# Patient Record
Sex: FEMALE | Race: OTHER
[De-identification: names, ages, dates, MRNs, and addresses within clinical notes are randomized per-mention and may not be internally consistent; named-entity substitution may affect disease eponyms.]

---

## 2018-12-07 NOTE — EMERGENCY ROOM REPORT
History of Present Illness


General


Chief Complaint:  Pain


Source:  Patient





Present Illness


HPI


Patient states that she has had a nephrostomy tube in her left kidney for the 

past 10 years.  She has a history of UVJ obstruction.  She states that she was 

admitted to another hospital 3 days ago.  She was found to have Escherichia 

coli bacteremia.  She states that she had a PICC line in place but states she 

noted that the insertion site was red and swollen and it was bothering her and 

so she removed the PICC line in her left arm.  She states that it looked 

infected and she has had infected PICC line's previously.  She states that she 

is also had nausea and vomiting and a headache over the past day.  She was 

supposed to be getting ertapenem via her PICC line for the past 2 days but 

because she removed the PICC line she has not gotten the ertapenem.  She has no 

other complaints.


Allergies:  


Coded Allergies:  


     No Known Allergies (Unverified , 12/7/18)





Patient History


Past Medical History:  none, see triage record


Social History:  Denies: smoking, alcohol use, drug use


Last Menstrual Period:  12/04/18


Reviewed Nursing Documentation:  PMH: Agreed; PSxH: Agreed





Nursing Documentation-PMH


Hx Dialysis:  No - nephrostomy tube on left side





Review of Systems


All Other Systems:  negative except mentioned in HPI





Physical Exam





Vital Signs








  Date Time  Temp Pulse Resp B/P (MAP) Pulse Ox O2 Delivery O2 Flow Rate FiO2


 


12/7/18 16:15 97.9 95 18 130/91 99 Room Air  








Sp02 EP Interpretation:  reviewed, normal


General Appearance:  no apparent distress, alert, GCS 15, non-toxic


Head:  normocephalic, atraumatic


Eyes:  bilateral eye normal inspection, bilateral eye PERRL


ENT:  hearing grossly normal, normal pharynx, no angioedema, normal voice


Neck:  full range of motion, supple/symm/no masses


Respiratory:  chest non-tender, lungs clear, normal breath sounds, no 

respiratory distress, no retraction, no accessory muscle use, speaking full 

sentences


Cardiovascular #1:  regular rate, rhythm, no edema


Gastrointestinal:  normal bowel sounds, non tender, soft, non-distended, no 

guarding, no rebound


Rectal:  deferred


Genitourinary:  other - Nephrostomy tube in place draining clear urine.


Musculoskeletal:  back normal, gait/station normal, normal range of motion, non-

tender


Neurologic:  alert, oriented x3, responsive, motor strength/tone normal, 

sensory intact, speech normal


Psychiatric:  judgement/insight normal, memory normal, mood/affect normal, no 

suicidal/homicidal ideation


Skin:  normal color, no rash, warm/dry, well hydrated





Medical Decision Making


Diagnostic Impression:  


 Primary Impression:  


 Flank pain


 Additional Impressions:  


 Nausea


 Diarrhea


ER Course


This patient removed her own PICC line.  She states that she has positive blood 

cultures for another facility.  The patient's evaluation here in the emergency 

Department is benign.  Laboratory workup to include a CBC, CMP and urinalysis 

are unremarkable.  The patient is afebrile well-appearing and nontoxic.  The 

patient was given the ertapenem dose for today.  I offered the patient 

admission to the hospital so that she can get PICC line placement tomorrow.  

However, she declined stating that she will go to her usual hospital and obtain 

a PICC line tomorrow morning.  She states she feels well and does not want to 

be admitted to the hospital at this time.  Given her benign workup and nontoxic 

evaluation I felt that this was appropriate.  I suspect the patient's nausea 

and diarrhea is secondary to medication side effects.  The patient is given 

close return precautions and follow-up instructions.





Please note that this Emergency Department Report was dictated using [employer] technology software, occasionally this can lead to 

erroneous entry secondary to interpretation by the dictation equipment.





Laboratory Tests








Test


  12/7/18


16:35 12/7/18


16:50 12/7/18


19:08


 


White Blood Count


  6.4 K/UL


(4.8-10.8) 


  


 


 


Red Blood Count


  4.26 M/UL


(4.20-5.40) 


  


 


 


Hemoglobin


  10.5 G/DL


(12.0-16.0)  L 


  


 


 


Hematocrit


  32.6 %


(37.0-47.0)  L 


  


 


 


Mean Corpuscular Volume


  76 FL (80-99)


L 


  


 


 


Mean Corpuscular Hemoglobin


  24.6 PG


(27.0-31.0)  L 


  


 


 


Mean Corpuscular Hemoglobin


Concent 32.1 G/DL


(32.0-36.0) 


  


 


 


Red Cell Distribution Width


  13.6 %


(11.6-14.8) 


  


 


 


Platelet Count


  430 K/UL


(150-450) 


  


 


 


Mean Platelet Volume


  5.3 FL


(6.5-10.1)  L 


  


 


 


Neutrophils (%) (Auto)


  59.7 %


(45.0-75.0) 


  


 


 


Lymphocytes (%) (Auto)


  26.8 %


(20.0-45.0) 


  


 


 


Monocytes (%) (Auto)


  8.3 %


(1.0-10.0) 


  


 


 


Eosinophils (%) (Auto)


  3.9 %


(0.0-3.0)  H 


  


 


 


Basophils (%) (Auto)


  1.3 %


(0.0-2.0) 


  


 


 


Sodium Level


  137 MMOL/L


(136-145) 


  


 


 


Potassium Level


  3.6 MMOL/L


(3.5-5.1) 


  


 


 


Chloride Level


  102 MMOL/L


() 


  


 


 


Carbon Dioxide Level


  25 MMOL/L


(21-32) 


  


 


 


Anion Gap


  10 mmol/L


(5-15) 


  


 


 


Blood Urea Nitrogen


  13 mg/dL


(7-18) 


  


 


 


Creatinine


  0.7 MG/DL


(0.55-1.30) 


  


 


 


Estimate Glomerular


Filtration Rate > 60 mL/min


(>60) 


  


 


 


Glucose Level


  101 MG/DL


() 


  


 


 


Lactic Acid Level


  2.00 mmol/L


(0.4-2.0) 


  Pending  


 


 


Calcium Level


  8.3 MG/DL


(8.5-10.1)  L 


  


 


 


Phosphorus Level


  3.2 MG/DL


(2.5-4.9) 


  


 


 


Magnesium Level


  1.7 MG/DL


(1.8-2.4)  L 


  


 


 


Total Bilirubin


  0.2 MG/DL


(0.2-1.0) 


  


 


 


Aspartate Amino Transferase


(AST) 20 U/L (15-37)


  


  


 


 


Alanine Aminotransferase (ALT)


  17 U/L (12-78)


  


  


 


 


Alkaline Phosphatase


  79 U/L


() 


  


 


 


Total Creatine Kinase


  56 U/L


() 


  


 


 


Creatine Kinase MB


  0.5 NG/ML


(0.0-3.6) 


  


 


 


Creatine Kinase MB Relative


Index 0.8  


  


  


 


 


Troponin I


  0.000 ng/mL


(0.000-0.056) 


  


 


 


Total Protein


  8.0 G/DL


(6.4-8.2) 


  


 


 


Albumin


  3.5 G/DL


(3.4-5.0) 


  


 


 


Globulin 4.5 g/dL    


 


Albumin/Globulin Ratio


  0.8 (1.0-2.7)


L 


  


 


 


Urine Color  Pale yellow   


 


Urine Appearance  Clear   


 


Urine pH  6.5 (4.5-8.0)   


 


Urine Specific Gravity


  


  1.010


(1.005-1.035) 


 


 


Urine Protein


  


  2+ (NEGATIVE)


H 


 


 


Urine Glucose (UA)


  


  Negative


(NEGATIVE) 


 


 


Urine Ketones


  


  Negative


(NEGATIVE) 


 


 


Urine Blood


  


  3+ (NEGATIVE)


H 


 


 


Urine Nitrite


  


  Negative


(NEGATIVE) 


 


 


Urine Bilirubin


  


  Negative


(NEGATIVE) 


 


 


Urine Urobilinogen


  


  Normal MG/DL


(0.0-1.0) 


 


 


Urine Leukocyte Esterase


  


  2+ (NEGATIVE)


H 


 


 


Urine RBC


  


  2-4 /HPF (0 -


2)  H 


 


 


Urine WBC


  


  2-4 /HPF (0 -


2) 


 


 


Urine Squamous Epithelial


Cells 


  None /LPF


(NONE/OCC) 


 


 


Urine Bacteria


  


  Occasional


/HPF (NONE) 


 








EKG Diagnostic Results


Rate:  normal


Rhythm:  NSR


ST Segments:  no acute changes





Rhythm Strip Diag. Results


EP Interpretation:  yes


Rate:  80's


Rhythm:  NSR, no PVC's, no ectopy





Last Vital Signs








  Date Time  Temp Pulse Resp B/P (MAP) Pulse Ox O2 Delivery O2 Flow Rate FiO2


 


12/7/18 16:15 97.9 95 18 130/91 99 Room Air  








Status:  improved


Disposition:  HOME, SELF-CARE


Condition:  Improved


Scripts


No Active Prescriptions or Reported Meds











Agnes Corbett DO Dec 7, 2018 16:51

## 2019-03-24 NOTE — NUR
ED Nurse Note:



Pt from home came in due to left flank that radiates to her LLQ x 3 days with 
vomiting. Denies diarrhea. Pt is AAO x4, ambulatory with non labored breathing.

## 2019-03-24 NOTE — NUR
ED Nurse Note:

REceived patient from LEMUEL Fairchild, patient at no distress at this time, 
waiting for ultrasound

## 2019-03-24 NOTE — EMERGENCY ROOM REPORT
History of Present Illness


General


Chief Complaint:  Abdominal Pain


Source:  Patient


 (Atul Panda)





Present Illness


HPI





28-year-old female with significant past medical history of chronic kidney 

disease and a tube insertion here complaining of diffuse abdominal pain 3 days 

mainly in the left lower quadrant.  Patient raises concerns about possible 

pregnancy as she was last sexually active 3 weeks ago and her last menstrual 

period was .  Patient woke up this morning with vomiting and 3 bouts 

of nonbloody diarrhea.  Complains of low-grade fever denies all URI symptoms.  

Complaints of urinary frequency however denies dysuria hematuria.  Denies 

vaginal spotting, clotting, bleeding.  Denies recent travel.  Patient makes 

regular visits with her nephrologist and urologist.  Has not been given any 

antibiotics recently.  Denies syncope, dizziness, headache, SOB, chest pain 

palpitations.


 (Atul Panda)


Allergies:  


Coded Allergies:  


     No Known Allergies (Unverified , 18)





Patient History


Past Medical History:  see triage record, dialysis


Past Surgical History:  none


Pertinent Family History:  none


Last Menstrual Period:  2019


Pregnant Now:  Yes


:  2


Reviewed Nursing Documentation:  PMH: Agreed; PSxH: Agreed (Atul Panda)





Nursing Documentation-PMH


Past Medical History:  No History, Except For


Hx Dialysis:  No - nephrostomy tube on left side


 (Atul Panda)





Review of Systems


All Other Systems:  negative except mentioned in HPI


 (Atul Panda)





Physical Exam





Vital Signs








  Date Time  Temp Pulse Resp B/P (MAP) Pulse Ox O2 Delivery O2 Flow Rate FiO2


 


3/24/19 12:28 98.2 89 22 104/74 98 Room Air  








Sp02 EP Interpretation:  reviewed, normal


General Appearance:  normal inspection, well appearing, no apparent distress


Head:  normocephalic


Eyes:  bilateral eye normal inspection, bilateral eye PERRL


ENT:  normal ENT inspection, normal pharynx


Neck:  normal inspection, full range of motion, supple


Respiratory:  normal inspection, chest non-tender, lungs clear, no rhonchi, no 

wheezing


Cardiovascular #1:  normal inspection, normal peripheral pulses, no edema, no 

murmur, normal capillary refill


Gastrointestinal:  no mass, no organomegaly, no peritonitis, no bruit, non-

distended, no guarding, no pulsatile mass, rebound - left lower quadr


Rectal:  deferred


Genitourinary:  no CVA tenderness, other - unclogged tube left flank


Musculoskeletal:  normal inspection, back normal, digits/nails normal


Neurologic:  normal inspection, alert, oriented x3


Psychiatric:  normal inspection, judgement/insight normal, mood/affect normal


Skin:  normal inspection, normal color, no rash, warm/dry


Lymphatic:  normal inspection, no adenopathy


 (Atul Panda)





Medical Decision Making


PA Attestation


Diagnoses and treatment plans are reviewed and discussed with my supervising 

physician Dr. Randhawa


 (Atul Panda)


PA Attestation


Please refer to the history and documentation for the full input


At this time patient's workup was also reviewed by myself


Abdominal ultrasound on the renal aspect does not reveal any hydronephrosis


Pelvic ultrasound was discussed with technician does not show any obvious large 

amount of free fluid





However given the patient's low beta Quant level and ectopic pregnancy cannot 

be fully ruled out at this time





Patient otherwise does appear comfortable ambulating in the emergency room 

without any active discomfort she requires close repeat beta Quant level along 

with ultrasonography by primary physician the next several days


 (Mert Randhawa DO)


Diagnostic Impression:  


 Primary Impression:  


 Pregnancy


 Additional Impressions:  


 UTI (urinary tract infection)


 Iron deficiency anemia


 Renal function impairment


 Viral gastroenteritis


ER Course


28-year-old female with significant past medical history of chronic kidney 

disease and a tube insertion here complaining of diffuse abdominal pain 3 days 

mainly in the left lower quadrant.  Patient raises concerns about possible 

pregnancy as she was last sexually active 3 weeks ago and her last menstrual 

period was .  Patient woke up this morning with vomiting and 3 bouts 

of nonbloody diarrhea.  Complains of low-grade fever denies all URI symptoms.  

Complaints of urinary frequency however denies dysuria hematuria.  Denies 

vaginal spotting, clotting, bleeding.  Denies recent travel.  Patient makes 

regular visits with her nephrologist and urologist.  Has not been given any 

antibiotics recently.  Denies syncope, dizziness, headache, SOB, chest pain 

palpitations.





Ddx considered but are not limited to ectopic pregnancy, UTI, pylonephritis, 

hydronephrosis, normal IUP, viral gastroentritis 





Vital signs: are WNL, pt. is afebrile





H&PE are most consistent with viral gastroentritis, UTI, IUP, iron deficiency 

anemia, 





ORDERS ferrous sulfate, macrobid 





ED INTERVENTIONS: NS bolus, tylenol, 








DISCHARGE: At this time pt. is stable for d/c to home. Will provide printed 

patient care instructions, and any necessary prescriptions. Care plan and 

follow up instructions have been discussed with the patient prior to discharge.





f/u with OB in 48 hours for repeat beta Hcg and OB US


Hgb 9, positive leuk UA, elevated lipase, posiitve beta HCG


 (Atul Panda)





CT/MRI/US Diagnostic Results


CT/MRI/US Diagnostic Results :  


   Imaging Test Ordered:  pelvic and renal


   Impression


US PELVIS:





Uterus appears heterogeneous, nonspecific. Endometrium is within normal limits 

for patient's age.


Bilateral ovaries are not enlarged.


No ovarian torsion.


Trace free fluid, possibly physiologic.








US ABDOMEN:





Stented left kidney.


No hydronephrosis or nephrolithiasis bilaterally. 


 (Atul Panda)





Last Vital Signs








  Date Time  Temp Pulse Resp B/P (MAP) Pulse Ox O2 Delivery O2 Flow Rate FiO2


 


3/24/19 15:23 98.2 67 22 114/72 98 Room Air  








 (Atul Panda)


Disposition:  HOME, SELF-CARE


Condition:  Stable


Scripts


Ferrous Sulfate* (FERROUS SULFATE*) 325 Mg Tablet


325 MG ORAL TWICE A DAY, #60 TAB 0 Refills


   Prov: Atul Panda         3/24/19 


Nitrofurantoin Monohyd/M-Cryst* (MACROBID 100 MG*) 100 Mg Capsule


100 MG ORAL EVERY 12 HOURS for 7 Days, #14 CAP


   Prov: Atul Panda         3/24/19


Referrals:  


[facility] C[facility],REFERRING (PCP)


Patient Instructions:  Abdominal Pain During Pregnancy, Iron Deficiency Anemia, 

Adult, Ovarian Cyst, Easy-to-Read





Additional Instructions:  


follow up with OB and nephrologist/ urologist











Atul Panda Mar 24, 2019 19:11


Mert Randhawa DO Mar 24, 2019 19:20

## 2019-03-24 NOTE — NUR
ED Nurse Note:

Called X-ray for ultrasuond, states they will call back when an on call tech is 
available

## 2021-01-28 ENCOUNTER — HOSPITAL ENCOUNTER (EMERGENCY)
Dept: HOSPITAL 72 - EMR | Age: 31
Discharge: HOME | End: 2021-01-28
Payer: COMMERCIAL

## 2021-01-28 VITALS — WEIGHT: 245 LBS | HEIGHT: 69 IN | BODY MASS INDEX: 36.29 KG/M2

## 2021-01-28 VITALS — DIASTOLIC BLOOD PRESSURE: 58 MMHG | SYSTOLIC BLOOD PRESSURE: 110 MMHG

## 2021-01-28 VITALS — SYSTOLIC BLOOD PRESSURE: 114 MMHG | DIASTOLIC BLOOD PRESSURE: 76 MMHG

## 2021-01-28 DIAGNOSIS — R10.9: Primary | ICD-10-CM

## 2021-01-28 DIAGNOSIS — N13.30: ICD-10-CM

## 2021-01-28 DIAGNOSIS — Z93.6: ICD-10-CM

## 2021-01-28 DIAGNOSIS — Z87.442: ICD-10-CM

## 2021-01-28 LAB
APPEARANCE UR: CLEAR
APTT PPP: (no result) S
GLUCOSE UR STRIP-MCNC: NEGATIVE MG/DL
KETONES UR QL STRIP: NEGATIVE
LEUKOCYTE ESTERASE UR QL STRIP: NEGATIVE
NITRITE UR QL STRIP: NEGATIVE
PH UR STRIP: 7 [PH] (ref 4.5–8)
PROT UR QL STRIP: NEGATIVE
SP GR UR STRIP: 1.01 (ref 1–1.03)
UROBILINOGEN UR-MCNC: NORMAL MG/DL (ref 0–1)

## 2021-01-28 PROCEDURE — 99284 EMERGENCY DEPT VISIT MOD MDM: CPT

## 2021-01-28 PROCEDURE — 96361 HYDRATE IV INFUSION ADD-ON: CPT

## 2021-01-28 PROCEDURE — 80307 DRUG TEST PRSMV CHEM ANLYZR: CPT

## 2021-01-28 PROCEDURE — 96375 TX/PRO/DX INJ NEW DRUG ADDON: CPT

## 2021-01-28 PROCEDURE — 74176 CT ABD & PELVIS W/O CONTRAST: CPT

## 2021-01-28 PROCEDURE — 81003 URINALYSIS AUTO W/O SCOPE: CPT

## 2021-01-28 PROCEDURE — 96374 THER/PROPH/DIAG INJ IV PUSH: CPT

## 2021-01-28 PROCEDURE — 81025 URINE PREGNANCY TEST: CPT

## 2021-01-28 PROCEDURE — 96372 THER/PROPH/DIAG INJ SC/IM: CPT

## 2021-01-28 NOTE — NUR
ED Nurse Note:

pt extremly hard stick. attempted several times with 2 other RN's. MD aware and 
is going to give meds IM.

## 2021-01-28 NOTE — EMERGENCY ROOM REPORT
History of Present Illness


General


Chief Complaint:  Abdominal Pain


Source:  Patient





Present Illness


HPI


Patient is a 30-year-old obese female history of kidney stones who presents to 

the ER complaining of left-sided flank pain for the past 2 days.  She complains 

of nausea and vomiting.  She denies any fever or chills.  She denies any 

diarrhea.  She complains of dysuria and occasional streaks of blood.  She denies

any chest pain or shortness of breath.  She states that she tried taking Norco 

at home but complains of persistent pain.


Allergies:  


Coded Allergies:  


     No Known Allergies (Unverified , 12/7/18)





COVID-19 Screening


Contact w/high risk pt:  No


Experienced COVID-19 symptoms?:  No


COVID-19 Testing performed PTA:  No





Patient History


Last Menstrual Period:  1/8


Pregnant Now:  No


Reviewed Nursing Documentation:  PMH: Agreed; PSxH: Agreed





Nursing Documentation-PMH


Past Medical History:  No History, Except For


Hx Dialysis:  No - nephrostomy tube on left side





Review of Systems


All Other Systems:  negative except mentioned in HPI





Physical Exam





Vital Signs








  Date Time  Temp Pulse Resp B/P (MAP) Pulse Ox O2 Delivery O2 Flow Rate FiO2


 


1/28/21 16:32 98.4 91 18 120/87 (98) 96   








Sp02 EP Interpretation:  reviewed, normal


General Appearance:  alert, GCS 15, non-toxic, mild distress


Head:  normocephalic, atraumatic


Eyes:  bilateral eye normal inspection, bilateral eye PERRL


ENT:  hearing grossly normal, normal pharynx, no angioedema, normal voice


Neck:  full range of motion, supple/symm/no masses


Respiratory:  chest non-tender, lungs clear, normal breath sounds, speaking full

sentences


Cardiovascular #1:  regular rate, rhythm, no edema


Gastrointestinal:  normal bowel sounds, non tender, soft, non-distended, no 

guarding, no rebound


Rectal:  deferred


Genitourinary:  CVA tenderness (L)


Musculoskeletal:  normal range of motion


Neurologic:  CNs III-XII nml as tested, oriented x3


Psychiatric:  no suicidal/homicidal ideation


Skin:  no rash


Lymphatic:  no adenopathy





Medical Decision Making


Diagnostic Impression:  


   Primary Impression:  


   Flank pain


   Additional Impression:  


   Hydronephrosis


ER Course


Patient's UA is negative.  No signs of infection or blood.  Patient's CT 

demonstrates hydronephrosis on the left side with no stones.  This is probably 

sequela from her prior kidney stone which she states is on the left side.  I 

have given the patient a copy of her CAT scan results.  She states she has a 

urologist that she can follow-up with.  I have given her intramuscular morphine 

which helped with her pain.  She will be given a prescription for Percocet.  

After discussing risks and benefits of further diagnostics, treatment plans, as 

well as indications for and risks of admission, the patient is agreeable to 

being discharged home.  I have explained that their evaluation and treatment in 

the emergency department today is an important step towards them achieving 

better health but that their evaluation today is not intended to replace further

evaluation and treatment by a physician in their local clinic.  I have explained

that while the current findings suggest no immediate life threatening emergency 

they will require further evaluation and treatment by a physician of their 

choice in their area.  They understand that it will be necessary for them to 

review the final reports of their ED visit with their clinic physician.  We have

reviewed indications for return to the Emergency Department.  I have explained 

that additional time may need to pass and/or additional testing as an outpatient

may be necessary before a definitive diagnosis can be made.  They tell me they 

are willing to follow up as instructed within the timeframe I recommend.  They 

appear to understand what we discussed.  Additionally they understand that if 

they are unable to be seen by an outpatient physician they are welcome, and in 

fact should, return to the Emergency Department for a repeat evaluation.  The 

patient is stable at time of discharge.





Laboratory Tests








Test


 1/28/21


16:50


 


Urine Color Pale yellow  


 


Urine Appearance Clear  


 


Urine pH 7 (4.5-8.0)  


 


Urine Specific Gravity


 1.010


(1.005-1.035)


 


Urine Protein


 Negative


(NEGATIVE)


 


Urine Glucose (UA)


 Negative


(NEGATIVE)


 


Urine Ketones


 Negative


(NEGATIVE)


 


Urine Blood


 Negative


(NEGATIVE)


 


Urine Nitrite


 Negative


(NEGATIVE)


 


Urine Bilirubin


 Negative


(NEGATIVE)


 


Urine Urobilinogen


 Normal MG/DL


(0.0-1.0)


 


Urine Leukocyte Esterase


 Negative


(NEGATIVE)


 


Urine HCG, Qualitative


 Negative


(NEGATIVE)


 


Urine Opiates Screen


 Negative


(NEGATIVE)


 


Urine Barbiturates Screen


 Negative


(NEGATIVE)


 


Phencyclidine (PCP) Screen


 Negative


(NEGATIVE)


 


Urine Amphetamines Screen


 Negative


(NEGATIVE)


 


Urine Benzodiazepines Screen


 Negative


(NEGATIVE)


 


Urine Cocaine Screen


 Negative


(NEGATIVE)


 


Urine Marijuana (THC) Screen


 Negative


(NEGATIVE)











Last Vital Signs








  Date Time  Temp Pulse Resp B/P (MAP) Pulse Ox O2 Delivery O2 Flow Rate FiO2


 


1/28/21 16:32 98.4 91 18 120/87 (98) 96   








Disposition:  HOME, SELF-CARE


Condition:  Stable


Scripts


Oxycodone/Acetaminophen 5-325* (PERCOCET 5-325 MG TABLET*) 1 Each Tablet


1 TAB ORAL Q6H PRN for For Pain, #12 TAB 0 Refills


   Prov: Dori Hannah M.D.         1/28/21





Additional Instructions:  


The patient was provided with discharge instructions, notified to follow-up with

a primary care doctor and or specialist in the next 24-48 hours, and to return 

to the ED if they have worsening of their symptoms. 





Please note that this report is being documented using [employer] technology.


This can lead to erroneous entry secondary to incorrect interpretation by the 

dictating instrument.











Dori Hannah M.D.        Jan 28, 2021 16:50

## 2021-01-28 NOTE — DIAGNOSTIC IMAGING REPORT
EXAM:

  CT Abdomen and Pelvis Without Intravenous Contrast

 

CLINICAL HISTORY:

  ABD PAIN

 

TECHNIQUE:

  Axial computed tomography images of the abdomen and pelvis without 

intravenous contrast.  CTDI is 19.1 mGy and DLP is 1088.1 mGy-cm.  One or 

more of the following dose reduction techniques were used: automated 

exposure control, adjustment of the mA and/or kV according to patient 

size, use of iterative reconstruction technique.

 

COMPARISON:

  3/24/2019.

 

FINDINGS:

  Lung bases:  Minimal scarring and subsegmental atelectasis at the lung 

bases.  Minimal subsegmental atelectasis at the lingular region.

  Pleural space:  No pleural effusions.

  Heart:  Heart is normal in size.

 

 ABDOMEN:

  Liver:  The liver and the spleen are normal in contour.

  Gallbladder and bile ducts:  Status post cholecystectomy.  No ductal 

dilation.

  Pancreas:  See below.  

  Spleen:  See above.

  Adrenals:  The adrenal glands, the head, body, tail of the pancreas are 

unremarkable.

  Kidneys and ureters:  Right kidney is unremarkable.  Moderate to severe 

left hydronephrosis.  Dilatation of the left collecting system.

  Stomach and bowel:  Mild to moderate quantity of ingested material in 

the stomach.  Moderate quantity of stool throughout the colon.  No 

obstruction.  No mucosal thickening.

 

 PELVIS:

  Appendix:  The appendix is seen on coronal images 51 through 53 and is 

unremarkable.

  Bladder:  Bladder is underdistended.  No stones.

  Reproductive:  Unremarkable as visualized.

 

 ABDOMEN and PELVIS:

  Intraperitoneal space:  Unremarkable.  No free air.  No significant 

fluid collection.

  Bones/joints:  No spondylolysis or spinal listhesis appeared  Sacrum 

and coccyx are unremarkable.  No acute fracture.  No dislocation.

  Soft tissues:  Ischiorectal fat is clean.

  Vasculature:  Unremarkable.  No abdominal aortic aneurysm.

  Lymph nodes:  No pelvic or inguinal lymphadenopathy.

 

IMPRESSION:     

1.  Status post cholecystectomy.

2.  Moderate hydronephrosis of the left kidney.  Dilatation of the left 

renal pelvis.  No renal calculus is noted.  Findings may be a 

manifestation of previous obstruction of the left kidney.  Left ureteral 

pelvic junction obstruction cannot be entirely excluded however.  I favor 

the possibility that the left kidney had been previously obstructed and 

the left renal collecting system remains patulous.  The left kidney had 

been stented on the previous ultrasound study of 3/24/2019.

3.  Appendix is unremarkable.

4.  No bowel obstruction.

## 2021-01-28 NOTE — NUR
ER DISCHARGE NOTE:

Patient is cleared to be discharged per ERMD, pt is aox4, on room air, with 
stable vital signs. pt was given dc and prescription instructions, pt was able 
to verbalize understanding. pt is able to ambulate with steady gait. pt took 
all belongings.

no complications with im injection. pt will f/u with her